# Patient Record
Sex: MALE | URBAN - METROPOLITAN AREA
[De-identification: names, ages, dates, MRNs, and addresses within clinical notes are randomized per-mention and may not be internally consistent; named-entity substitution may affect disease eponyms.]

---

## 2023-06-15 ENCOUNTER — NURSING HOME VISIT (OUTPATIENT)
Dept: POST ACUTE CARE | Facility: EXTERNAL LOCATION | Age: 63
End: 2023-06-15

## 2023-06-15 VITALS
TEMPERATURE: 97.5 F | SYSTOLIC BLOOD PRESSURE: 159 MMHG | HEART RATE: 75 BPM | DIASTOLIC BLOOD PRESSURE: 61 MMHG | WEIGHT: 268 LBS

## 2023-06-15 DIAGNOSIS — F02.B0 MODERATE EARLY ONSET ALZHEIMER'S DEMENTIA WITHOUT BEHAVIORAL DISTURBANCE, PSYCHOTIC DISTURBANCE, MOOD DISTURBANCE, OR ANXIETY (MULTI): ICD-10-CM

## 2023-06-15 DIAGNOSIS — G30.0 MODERATE EARLY ONSET ALZHEIMER'S DEMENTIA WITHOUT BEHAVIORAL DISTURBANCE, PSYCHOTIC DISTURBANCE, MOOD DISTURBANCE, OR ANXIETY (MULTI): ICD-10-CM

## 2023-06-15 DIAGNOSIS — I10 PRIMARY HYPERTENSION: Primary | ICD-10-CM

## 2023-06-15 PROCEDURE — 99342 HOME/RES VST NEW LOW MDM 30: CPT | Performed by: NURSE PRACTITIONER

## 2023-06-15 NOTE — ASSESSMENT & PLAN NOTE
Patient stable, will continue same treatment and monitor. Nursing to inform CNP/MD of any changes in condition or new problems    On Seroquel and Namenda

## 2023-06-15 NOTE — PROGRESS NOTES
Subjective   Dilshad Rees is a 63 y.o. male who is assisted living/ home patient being seen and evaluated for multiple medical problems.    63y old male with early onset dementia is newly admitted to [facility] memory care unit. He seems to be adjusting well, he was being seen by a psychiatric NP. He has been stable on current medications. A call was placed to her wife and discussed any concerns she may have.    50% of time was spent on preparing to see the patient, performing exam or evaluation, coordination of care, ordering medications,tests and labs, referring and communicating with other health care providers , interpreting results, obtaining and reviewing history, tests, medications and documenting clinical information  EMR. Time spent >35 minutes             Objective   /61   Pulse 75   Temp 36.4 °C (97.5 °F)   Wt 122 kg (268 lb)     Physical Exam  Vitals and nursing note reviewed.   Constitutional:       General: He is not in acute distress.  HENT:      Head: Normocephalic and atraumatic.   Eyes:      Pupils: Pupils are equal, round, and reactive to light.   Cardiovascular:      Rate and Rhythm: Normal rate and regular rhythm.   Pulmonary:      Effort: Pulmonary effort is normal.      Breath sounds: Normal breath sounds.   Skin:     General: Skin is warm and dry.   Neurological:      Mental Status: He is alert. He is disoriented.   Psychiatric:         Mood and Affect: Mood normal.         Assessment/Plan   Problem List Items Addressed This Visit       Moderate early onset Alzheimer's dementia without behavioral disturbance, psychotic disturbance, mood disturbance, or anxiety (CMS/HCC)     Patient stable, will continue same treatment and monitor. Nursing to inform CNP/MD of any changes in condition or new problems    On Seroquel and Namenda         Primary hypertension - Primary     Monitor and continue same treatment  On Fosinopril 40mg daily

## 2023-06-15 NOTE — LETTER
Patient: Dilshad Rees  : 1960    Encounter Date: 06/15/2023    Subjective  Dilshad Rees is a 63 y.o. male who is assisted living/ home patient being seen and evaluated for multiple medical problems.    63y old male with early onset dementia is newly admitted to [facility] memory care unit. He seems to be adjusting well, he was being seen by a psychiatric NP. He has been stable on current medications. A call was placed to her wife and discussed any concerns she may have.    50% of time was spent on preparing to see the patient, performing exam or evaluation, coordination of care, ordering medications,tests and labs, referring and communicating with other health care providers , interpreting results, obtaining and reviewing history, tests, medications and documenting clinical information  EMR. Time spent >35 minutes             Objective  /61   Pulse 75   Temp 36.4 °C (97.5 °F)   Wt 122 kg (268 lb)     Physical Exam  Vitals and nursing note reviewed.   Constitutional:       General: He is not in acute distress.  HENT:      Head: Normocephalic and atraumatic.   Eyes:      Pupils: Pupils are equal, round, and reactive to light.   Cardiovascular:      Rate and Rhythm: Normal rate and regular rhythm.   Pulmonary:      Effort: Pulmonary effort is normal.      Breath sounds: Normal breath sounds.   Skin:     General: Skin is warm and dry.   Neurological:      Mental Status: He is alert. He is disoriented.   Psychiatric:         Mood and Affect: Mood normal.         Assessment/Plan  Problem List Items Addressed This Visit       Moderate early onset Alzheimer's dementia without behavioral disturbance, psychotic disturbance, mood disturbance, or anxiety (CMS/HCC)     Patient stable, will continue same treatment and monitor. Nursing to inform CNP/MD of any changes in condition or new problems    On Seroquel and Namenda         Primary hypertension - Primary     Monitor and continue same treatment  On  Fosinopril 40mg daily              Electronically Signed By: TIFFANY Lou   6/15/23  7:12 PM

## 2023-06-20 ENCOUNTER — NURSING HOME VISIT (OUTPATIENT)
Dept: POST ACUTE CARE | Facility: EXTERNAL LOCATION | Age: 63
End: 2023-06-20

## 2023-06-20 DIAGNOSIS — F02.B0 MODERATE EARLY ONSET ALZHEIMER'S DEMENTIA WITHOUT BEHAVIORAL DISTURBANCE, PSYCHOTIC DISTURBANCE, MOOD DISTURBANCE, OR ANXIETY (MULTI): Primary | ICD-10-CM

## 2023-06-20 DIAGNOSIS — E55.9 VITAMIN D DEFICIENCY: ICD-10-CM

## 2023-06-20 DIAGNOSIS — G30.0 MODERATE EARLY ONSET ALZHEIMER'S DEMENTIA WITHOUT BEHAVIORAL DISTURBANCE, PSYCHOTIC DISTURBANCE, MOOD DISTURBANCE, OR ANXIETY (MULTI): Primary | ICD-10-CM

## 2023-06-20 PROCEDURE — 99348 HOME/RES VST EST LOW MDM 30: CPT | Performed by: NURSE PRACTITIONER

## 2023-06-20 NOTE — LETTER
Patient: Dilshad Rees  : 1960    Encounter Date: 2023    Subjective  Dilshad Rees is a 63 y.o. male who is assisted living/ home patient being seen and evaluated for multiple medical problems.    Labs reviewed and okay except Vitamin D was low, B12 pending.    There were multiple medical problems reviewed. Medications, labs and orders reviewed. Patient seen and evaluated,   discussed with nursing staff                 Objective      Physical Exam  Vitals and nursing note reviewed.   Constitutional:       General: He is not in acute distress.  HENT:      Head: Normocephalic and atraumatic.   Pulmonary:      Effort: Pulmonary effort is normal.   Neurological:      Mental Status: He is alert. He is disoriented.   Psychiatric:         Mood and Affect: Mood normal.         Assessment/Plan  Problem List Items Addressed This Visit       Moderate early onset Alzheimer's dementia without behavioral disturbance, psychotic disturbance, mood disturbance, or anxiety (CMS/HCC) - Primary     Patient stable, will continue same treatment and monitor. Nursing to inform CNP/MD of any changes in condition or new problems         Vitamin D deficiency     Level 26.98  Start Vitamin D 3 2000 units daily              Electronically Signed By: TIFFANY Lou   23  3:55 PM

## 2023-06-20 NOTE — PROGRESS NOTES
Subjective   Dilshad Rees is a 63 y.o. male who is assisted living/ home patient being seen and evaluated for multiple medical problems.    Labs reviewed and okay except Vitamin D was low, B12 pending.    There were multiple medical problems reviewed. Medications, labs and orders reviewed. Patient seen and evaluated,   discussed with nursing staff                 Objective       Physical Exam  Vitals and nursing note reviewed.   Constitutional:       General: He is not in acute distress.  HENT:      Head: Normocephalic and atraumatic.   Pulmonary:      Effort: Pulmonary effort is normal.   Neurological:      Mental Status: He is alert. He is disoriented.   Psychiatric:         Mood and Affect: Mood normal.         Assessment/Plan   Problem List Items Addressed This Visit       Moderate early onset Alzheimer's dementia without behavioral disturbance, psychotic disturbance, mood disturbance, or anxiety (CMS/HCC) - Primary     Patient stable, will continue same treatment and monitor. Nursing to inform CNP/MD of any changes in condition or new problems         Vitamin D deficiency     Level 26.98  Start Vitamin D 3 2000 units daily

## 2023-07-12 DIAGNOSIS — I10 PRIMARY HYPERTENSION: Primary | ICD-10-CM

## 2023-07-12 RX ORDER — FOSINOPRIL SODIUM 20 MG/1
40 TABLET ORAL DAILY
Qty: 60 TABLET | Refills: 11 | Status: SHIPPED | OUTPATIENT
Start: 2023-07-12 | End: 2024-07-11

## 2023-07-18 ENCOUNTER — NURSING HOME VISIT (OUTPATIENT)
Dept: POST ACUTE CARE | Facility: EXTERNAL LOCATION | Age: 63
End: 2023-07-18
Payer: MEDICARE

## 2023-07-18 DIAGNOSIS — L70.8 FOLLICULAR ACNE: ICD-10-CM

## 2023-07-18 DIAGNOSIS — R44.1 HALLUCINATION, VISUAL: Primary | ICD-10-CM

## 2023-07-18 PROBLEM — L70.0 ACNE VULGARIS: Status: ACTIVE | Noted: 2023-07-18

## 2023-07-18 PROCEDURE — 99349 HOME/RES VST EST MOD MDM 40: CPT | Performed by: NURSE PRACTITIONER

## 2023-07-18 NOTE — LETTER
Patient: Dilshad Rees  : 1960    Encounter Date: 2023    Subjective  Dilshad Rees is a 63 y.o. male who is assisted living/ home patient being seen and evaluated for multiple medical problems.    Resident seen today with his wife at bedside for concerns of some rash on the back of his neck and he has been hallucinating. He states the bugs are everywhere and are causing the bumps on his posterior neck. His wife does have an appointment with his Neurologist tomorrow, he may increase his seroquel. Also discussed with her a plan to obtain labs and a UA. I believe what is on the back of his neck is acne or ingrown hair follicles. She states he is always touching behind his head and had been shaving excessively, razor has since been removed. Spoke with pharmacy also regarding treatment change.     50% of time was spent on preparing to see the patient, performing exam or evaluation, coordination of care, ordering medications,tests and labs, referring and communicating with other health care providers , interpreting results, obtaining and reviewing history, tests, medications and documenting clinical information  EMR. Time spent >35 minutes             Objective  There were no vitals taken for this visit.    Physical Exam  Vitals and nursing note reviewed.   Constitutional:       General: He is not in acute distress.  HENT:      Head: Normocephalic and atraumatic.   Pulmonary:      Effort: Pulmonary effort is normal.   Musculoskeletal:      Cervical back: Normal range of motion.   Skin:     General: Skin is warm and dry.      Comments: Small red papules scattered on back of neck area below the hairline.    Neurological:      Mental Status: He is alert. He is disoriented.   Psychiatric:         Mood and Affect: Mood normal.         Assessment/Plan  Problem List Items Addressed This Visit       Hallucination, visual - Primary     Check UA C&S, CBC and BMP  Follow up as scheduled with neurology          Follicular acne     Acne verses ingrown hair follicles that are inflamed.  Tetracycline ordered but not available, pharmacy will substitute with medication equivalent x 2 weeks  Also tretinoin not available and expensive , pharmacy substitute with Benzoyl peroxide BID x 2 weeks               Electronically Signed By: TIFFANY Lou   7/18/23  5:07 PM

## 2023-07-18 NOTE — ASSESSMENT & PLAN NOTE
Acne verses ingrown hair follicles that are inflamed.  Tetracycline ordered but not available, pharmacy will substitute with medication equivalent x 2 weeks  Also tretinoin not available and expensive , pharmacy substitute with Benzoyl peroxide BID x 2 weeks

## 2023-07-18 NOTE — PROGRESS NOTES
Subjective   Dilshad Rees is a 63 y.o. male who is assisted living/ home patient being seen and evaluated for multiple medical problems.    Resident seen today with his wife at bedside for concerns of some rash on the back of his neck and he has been hallucinating. He states the bugs are everywhere and are causing the bumps on his posterior neck. His wife does have an appointment with his Neurologist tomorrow, he may increase his seroquel. Also discussed with her a plan to obtain labs and a UA. I believe what is on the back of his neck is acne or ingrown hair follicles. She states he is always touching behind his head and had been shaving excessively, razor has since been removed. Spoke with pharmacy also regarding treatment change.     50% of time was spent on preparing to see the patient, performing exam or evaluation, coordination of care, ordering medications,tests and labs, referring and communicating with other health care providers , interpreting results, obtaining and reviewing history, tests, medications and documenting clinical information  EMR. Time spent >35 minutes             Objective   There were no vitals taken for this visit.    Physical Exam  Vitals and nursing note reviewed.   Constitutional:       General: He is not in acute distress.  HENT:      Head: Normocephalic and atraumatic.   Pulmonary:      Effort: Pulmonary effort is normal.   Musculoskeletal:      Cervical back: Normal range of motion.   Skin:     General: Skin is warm and dry.      Comments: Small red papules scattered on back of neck area below the hairline.    Neurological:      Mental Status: He is alert. He is disoriented.   Psychiatric:         Mood and Affect: Mood normal.         Assessment/Plan   Problem List Items Addressed This Visit       Hallucination, visual - Primary     Check UA C&S, CBC and BMP  Follow up as scheduled with neurology         Follicular acne     Acne verses ingrown hair follicles that are  inflamed.  Tetracycline ordered but not available, pharmacy will substitute with medication equivalent x 2 weeks  Also tretinoin not available and expensive , pharmacy substitute with Benzoyl peroxide BID x 2 weeks

## 2023-07-24 ENCOUNTER — NURSING HOME VISIT (OUTPATIENT)
Dept: POST ACUTE CARE | Facility: EXTERNAL LOCATION | Age: 63
End: 2023-07-24
Payer: MEDICARE

## 2023-07-24 DIAGNOSIS — L70.8 FOLLICULAR ACNE: ICD-10-CM

## 2023-07-24 DIAGNOSIS — G30.0 MODERATE EARLY ONSET ALZHEIMER'S DEMENTIA WITHOUT BEHAVIORAL DISTURBANCE, PSYCHOTIC DISTURBANCE, MOOD DISTURBANCE, OR ANXIETY (MULTI): Primary | ICD-10-CM

## 2023-07-24 DIAGNOSIS — F02.B0 MODERATE EARLY ONSET ALZHEIMER'S DEMENTIA WITHOUT BEHAVIORAL DISTURBANCE, PSYCHOTIC DISTURBANCE, MOOD DISTURBANCE, OR ANXIETY (MULTI): Primary | ICD-10-CM

## 2023-07-24 DIAGNOSIS — R44.1 HALLUCINATION, VISUAL: ICD-10-CM

## 2023-07-24 PROCEDURE — 99348 HOME/RES VST EST LOW MDM 30: CPT | Performed by: NURSE PRACTITIONER

## 2023-07-24 NOTE — PROGRESS NOTES
Subjective   Dilshad Rees is a 63 y.o. male who is assisted living/ home patient being seen and evaluated for multiple medical problems.    Follow up on rash and hallucinations. He was seen by neurology/psych NP and his Seroquel was increased to 50 mg, seems to be doing better. His acne to back of his neck and face is resolved, skin looks clear. He was started on Benzoyl peroxide cream last visit. Blood work and UA were negative for any acute problems    There were multiple medical problems reviewed. Medications, labs and orders reviewed. Patient seen and evaluated,   discussed with nursing staff             Objective   There were no vitals taken for this visit.    Physical Exam  Vitals and nursing note reviewed.   Constitutional:       General: He is not in acute distress.  HENT:      Head: Normocephalic and atraumatic.   Pulmonary:      Effort: Pulmonary effort is normal.   Musculoskeletal:      Cervical back: Normal range of motion.   Skin:     General: Skin is warm and dry.   Neurological:      Mental Status: He is alert. He is disoriented.   Psychiatric:         Mood and Affect: Mood normal.         Assessment/Plan   Problem List Items Addressed This Visit       Moderate early onset Alzheimer's dementia without behavioral disturbance, psychotic disturbance, mood disturbance, or anxiety (CMS/HCC) - Primary    Hallucination, visual     Improved  Seroquel increased         Follicular acne     Resolved at this time

## 2023-07-24 NOTE — LETTER
Patient: Dilshad Rees  : 1960    Encounter Date: 2023    Subjective  Dilshad Rees is a 63 y.o. male who is assisted living/ home patient being seen and evaluated for multiple medical problems.    Follow up on rash and hallucinations. He was seen by neurology/psych NP and his Seroquel was increased to 50 mg, seems to be doing better. His acne to back of his neck and face is resolved, skin looks clear. He was started on Benzoyl peroxide cream last visit. Blood work and UA were negative for any acute problems    There were multiple medical problems reviewed. Medications, labs and orders reviewed. Patient seen and evaluated,   discussed with nursing staff             Objective  There were no vitals taken for this visit.    Physical Exam  Vitals and nursing note reviewed.   Constitutional:       General: He is not in acute distress.  HENT:      Head: Normocephalic and atraumatic.   Pulmonary:      Effort: Pulmonary effort is normal.   Musculoskeletal:      Cervical back: Normal range of motion.   Skin:     General: Skin is warm and dry.   Neurological:      Mental Status: He is alert. He is disoriented.   Psychiatric:         Mood and Affect: Mood normal.         Assessment/Plan  Problem List Items Addressed This Visit       Moderate early onset Alzheimer's dementia without behavioral disturbance, psychotic disturbance, mood disturbance, or anxiety (CMS/HCC) - Primary    Hallucination, visual     Improved  Seroquel increased         Follicular acne     Resolved at this time              Electronically Signed By: LLOYD Lou-CNP   23  3:05 PM

## 2024-01-16 ENCOUNTER — NURSING HOME VISIT (OUTPATIENT)
Dept: POST ACUTE CARE | Facility: EXTERNAL LOCATION | Age: 64
End: 2024-01-16
Payer: MEDICARE

## 2024-01-16 DIAGNOSIS — F02.B0 MODERATE EARLY ONSET ALZHEIMER'S DEMENTIA WITHOUT BEHAVIORAL DISTURBANCE, PSYCHOTIC DISTURBANCE, MOOD DISTURBANCE, OR ANXIETY (MULTI): Primary | ICD-10-CM

## 2024-01-16 DIAGNOSIS — I10 PRIMARY HYPERTENSION: ICD-10-CM

## 2024-01-16 DIAGNOSIS — G30.0 MODERATE EARLY ONSET ALZHEIMER'S DEMENTIA WITHOUT BEHAVIORAL DISTURBANCE, PSYCHOTIC DISTURBANCE, MOOD DISTURBANCE, OR ANXIETY (MULTI): Primary | ICD-10-CM

## 2024-01-16 DIAGNOSIS — E55.9 VITAMIN D DEFICIENCY: ICD-10-CM

## 2024-01-16 PROCEDURE — 99347 HOME/RES VST EST SF MDM 20: CPT | Performed by: INTERNAL MEDICINE

## 2024-01-16 NOTE — PROGRESS NOTES
Follow-up visit  Patient has no complaints and no issues reported by staff.  We are seeing the patient on a routine follow-up.    Medications and orders were reviewed.  Labs reviewed again and were essentially unremarkable for except for a low vitamin D.  These labs were from several months ago and the patient has currently been placed on vitamin D 2000 units daily.    Review of systems  Patient denies pain  Patient denies postural dizziness  No shortness of breath chest pain  No GI distress    Assessment and care plan  Vital signs are stable with a blood pressure 123/63, temp 97 6, pulse 78 pulse ox 98%  He is alert pleasant no apparent distress HEENT is grossly unremarkable.  Lungs are clear.  Heart rate and rhythm is regular.  No significant edema.  Abdomen is soft    Assessment and care plan  Dementia which appears stable with no behavioral issues  Hypertension appears adequately controlled.    Vitamin D insufficiency is on supplement.  No new orders at this time

## 2024-07-15 ENCOUNTER — TELEPHONE (OUTPATIENT)
Dept: PRIMARY CARE | Facility: CLINIC | Age: 64
End: 2024-07-15
Payer: MEDICARE

## 2024-07-15 DIAGNOSIS — G30.0 MODERATE EARLY ONSET ALZHEIMER'S DEMENTIA WITHOUT BEHAVIORAL DISTURBANCE, PSYCHOTIC DISTURBANCE, MOOD DISTURBANCE, OR ANXIETY (MULTI): ICD-10-CM

## 2024-07-15 DIAGNOSIS — F02.B0 MODERATE EARLY ONSET ALZHEIMER'S DEMENTIA WITHOUT BEHAVIORAL DISTURBANCE, PSYCHOTIC DISTURBANCE, MOOD DISTURBANCE, OR ANXIETY (MULTI): ICD-10-CM

## 2024-07-15 NOTE — TELEPHONE ENCOUNTER
Patient diagnosed with Alzheimers, difficulty getting to MD office. Patient current with [facility] who referred patient to [facility] for primary care. Appointment scheduled w/ Ave Koenig NP 10:00am.

## 2024-07-17 ENCOUNTER — TELEPHONE (OUTPATIENT)
Dept: PRIMARY CARE | Facility: CLINIC | Age: 64
End: 2024-07-17
Payer: MEDICARE

## 2024-07-18 ENCOUNTER — OFFICE VISIT (OUTPATIENT)
Dept: PRIMARY CARE | Facility: CLINIC | Age: 64
End: 2024-07-18
Payer: MEDICARE

## 2024-07-18 VITALS
RESPIRATION RATE: 16 BRPM | SYSTOLIC BLOOD PRESSURE: 140 MMHG | TEMPERATURE: 99.6 F | OXYGEN SATURATION: 96 % | HEART RATE: 62 BPM | DIASTOLIC BLOOD PRESSURE: 72 MMHG | WEIGHT: 246.8 LBS

## 2024-07-18 DIAGNOSIS — G30.0 MODERATE EARLY ONSET ALZHEIMER'S DEMENTIA WITHOUT BEHAVIORAL DISTURBANCE, PSYCHOTIC DISTURBANCE, MOOD DISTURBANCE, OR ANXIETY (MULTI): Primary | ICD-10-CM

## 2024-07-18 DIAGNOSIS — F02.B0 MODERATE EARLY ONSET ALZHEIMER'S DEMENTIA WITHOUT BEHAVIORAL DISTURBANCE, PSYCHOTIC DISTURBANCE, MOOD DISTURBANCE, OR ANXIETY (MULTI): ICD-10-CM

## 2024-07-18 DIAGNOSIS — G30.0 MODERATE EARLY ONSET ALZHEIMER'S DEMENTIA WITHOUT BEHAVIORAL DISTURBANCE, PSYCHOTIC DISTURBANCE, MOOD DISTURBANCE, OR ANXIETY (MULTI): ICD-10-CM

## 2024-07-18 DIAGNOSIS — E55.9 VITAMIN D DEFICIENCY: ICD-10-CM

## 2024-07-18 DIAGNOSIS — F02.B0 MODERATE EARLY ONSET ALZHEIMER'S DEMENTIA WITHOUT BEHAVIORAL DISTURBANCE, PSYCHOTIC DISTURBANCE, MOOD DISTURBANCE, OR ANXIETY (MULTI): Primary | ICD-10-CM

## 2024-07-18 DIAGNOSIS — R44.1 HALLUCINATION, VISUAL: ICD-10-CM

## 2024-07-18 PROCEDURE — 1159F MED LIST DOCD IN RCRD: CPT | Performed by: NURSE PRACTITIONER

## 2024-07-18 PROCEDURE — 1160F RVW MEDS BY RX/DR IN RCRD: CPT | Performed by: NURSE PRACTITIONER

## 2024-07-18 PROCEDURE — 3077F SYST BP >= 140 MM HG: CPT | Performed by: NURSE PRACTITIONER

## 2024-07-18 PROCEDURE — 3078F DIAST BP <80 MM HG: CPT | Performed by: NURSE PRACTITIONER

## 2024-07-18 PROCEDURE — 1126F AMNT PAIN NOTED NONE PRSNT: CPT | Performed by: NURSE PRACTITIONER

## 2024-07-18 RX ORDER — QUETIAPINE FUMARATE 100 MG/1
100 TABLET, FILM COATED ORAL NIGHTLY
COMMUNITY

## 2024-07-18 RX ORDER — ACETAMINOPHEN 500 MG
TABLET ORAL DAILY
COMMUNITY

## 2024-07-18 RX ORDER — SERTRALINE HYDROCHLORIDE 100 MG/1
50 TABLET, FILM COATED ORAL DAILY
COMMUNITY

## 2024-07-18 ASSESSMENT — PAIN SCALES - GENERAL: PAINLEVEL: 0-NO PAIN

## 2024-07-18 ASSESSMENT — ENCOUNTER SYMPTOMS
FATIGUE: 1
ACTIVITY CHANGE: 1
HALLUCINATIONS: 1
RHINORRHEA: 1
APPETITE CHANGE: 1
EYES NEGATIVE: 1
VOICE CHANGE: 1
CONFUSION: 1

## 2024-07-18 NOTE — PROGRESS NOTES
Subjective   Patient ID: Dilshad Rees is a 64 y.o. male who is being seen to establish care with the [facility].    HPI Pt seen in single family apartment accompanied by wife. PMHx: HTN, Dementia, Vitamin D deficiency, Hallucinations. Pt seen sitting in recliner with legs dependent. Pt alert to self only. ROS/HPI supplemented by wife. Pt diagnosed with dementia in 2017. Pt total care for pts wife. Pt not able to feed self and needs assist with showering, dressing, and medications are given in yogurt. Pts wife starting to sponge bath pt as it is getting harder for her to shower him regularly. Pts balance good and he is physically strong. Pt can only follow one task at a time. Pt with frequent hallucinations. Pt has them any time during the day and not just at nighttime. Pt active with palliative care through [facility]. Pt does all cooking and cleaning in the home. Pt having difficulty getting out of the house and can take her hours to get him out to appointments. Pt follows with neurologist at [facility]. Pt weaning off namenda and has 2 days left and then will be off. Neurology did not think it was helping pt. Pts wife does not want any other dementia medication, she would rather see him at his baseline and deal with him like that. Pt with no family support. Pts wife states she has people looking into help for her. Pts wife being worked up for cancer. Pt with myoclonus and takes him awhile to get going in the mornings and has extreme confusion in the morning. Pt unaware when has to go to bathroom but wife toilets him and has success with that. Pt wears pull ups and is dry in the morning. Pt sleeps approximately 12 hours a night and usually sleeps through the night. Pt does not nap during the day. Pt was a  previously. Pt energy fair, doesn't do much, will take him for walks around the complex and [place]. Appetite not as good as it use to be, she will feed him food until  he says he no longer wants anymore. Tries to give a high protein meal in the morning. Pt was in memory care from November 2022 to March 2024, but was brought home because wife was unhappy with the care he was receiving. Pts wife denies noticing Sob or cough. Denies coughing after eating or drinking. Pt does not complain of pain.   Pt with no other complaints or concerns.  Initial encounter for [facility] NP visit. Program explained and contact information provided, all questions answered with apparent satisfaction.  More than 50% of time spent in face to face discussion a total of 60 minutes with this patient on counseling, coordination, of care, collaboration with staff and family, and review of medical records and  diagnostics  Home Visit medically necessary due to: pt has chronic condition that makes access to a traditional office visit very difficult, illness or condition that results in activity limitation or restriction that impacts the ability to leave home such as; unsteady gait/ poor condition.    Review of Systems   Unable to perform ROS: Dementia   Constitutional:  Positive for activity change, appetite change and fatigue.   HENT:  Positive for congestion, rhinorrhea and voice change.    Eyes: Negative.    Skin: Negative.    Allergic/Immunologic: Positive for environmental allergies.   Psychiatric/Behavioral:  Positive for confusion and hallucinations.        Objective   /72 (BP Location: Left arm, Patient Position: Sitting, BP Cuff Size: Adult)   Pulse 62   Temp 37.6 °C (99.6 °F) (Temporal)   Resp 16   Wt 112 kg (246 lb 12.8 oz)   SpO2 96%       Current Outpatient Medications:     cholecalciferol (Vitamin D3) 50 mcg (2,000 unit) capsule, Take by mouth once daily., Disp: , Rfl:     QUEtiapine (SEROquel) 100 mg tablet, Take 1 tablet (100 mg) by mouth once daily at bedtime., Disp: , Rfl:     sertraline (Zoloft) 100 mg tablet, Take 0.5 tablets (50 mg) by mouth once daily., Disp: , Rfl:      Physical Exam  Constitutional:       Appearance: Normal appearance. He is obese.   HENT:      Head: Normocephalic and atraumatic.      Nose: Nose normal.      Mouth/Throat:      Mouth: Mucous membranes are moist.      Pharynx: Oropharynx is clear.   Eyes:      Conjunctiva/sclera: Conjunctivae normal.      Pupils: Pupils are equal, round, and reactive to light.   Cardiovascular:      Rate and Rhythm: Normal rate and regular rhythm.      Pulses: Normal pulses.      Heart sounds: Normal heart sounds.   Pulmonary:      Effort: Pulmonary effort is normal.      Breath sounds: Examination of the right-lower field reveals decreased breath sounds. Examination of the left-lower field reveals decreased breath sounds. Decreased breath sounds present.   Abdominal:      General: Bowel sounds are normal.      Palpations: Abdomen is soft.   Musculoskeletal:         General: Normal range of motion.      Cervical back: Normal range of motion and neck supple.   Skin:     General: Skin is warm and dry.      Capillary Refill: Capillary refill takes less than 2 seconds.   Neurological:      Mental Status: He is alert. Mental status is at baseline. He is disoriented.   Psychiatric:         Attention and Perception: Attention normal.         Mood and Affect: Mood normal.         Speech: Speech normal.         Behavior: Behavior is cooperative.         Cognition and Memory: Cognition is impaired. Memory is impaired. He exhibits impaired recent memory and impaired remote memory.     Patient Active Problem List   Diagnosis    Moderate early onset Alzheimer's dementia without behavioral disturbance, psychotic disturbance, mood disturbance, or anxiety (Multi)    Primary hypertension    Vitamin D deficiency    Hallucination, visual    Follicular acne         Assessment/Plan   Diagnoses and all orders for this visit:  Moderate early onset Alzheimer's dementia without behavioral disturbance, psychotic disturbance, mood disturbance, or anxiety  (Multi)  Comments:  Continue Sertraline 50 mg po daily.  Continue Seroquel 100 mg po nightly.  Hallucination, visual  Comments:  Continue Seroquel 100 mg po nightly.  Vitamin D deficiency  Comments:  Continue Vitamin D 200 iu po daily.    Follow up in 2 months or prn  Ave Koenig, APRN-CNP

## 2024-09-25 ENCOUNTER — TELEPHONE (OUTPATIENT)
Dept: PRIMARY CARE | Facility: CLINIC | Age: 64
End: 2024-09-25
Payer: MEDICARE

## 2024-09-26 ENCOUNTER — OFFICE VISIT (OUTPATIENT)
Dept: PRIMARY CARE | Facility: CLINIC | Age: 64
End: 2024-09-26
Payer: MEDICARE

## 2024-09-26 ENCOUNTER — LAB (OUTPATIENT)
Dept: LAB | Facility: LAB | Age: 64
End: 2024-09-26
Payer: MEDICARE

## 2024-09-26 VITALS
TEMPERATURE: 99.1 F | OXYGEN SATURATION: 97 % | WEIGHT: 242.5 LBS | DIASTOLIC BLOOD PRESSURE: 76 MMHG | SYSTOLIC BLOOD PRESSURE: 150 MMHG | RESPIRATION RATE: 16 BRPM | HEART RATE: 56 BPM

## 2024-09-26 DIAGNOSIS — I10 PRIMARY HYPERTENSION: ICD-10-CM

## 2024-09-26 DIAGNOSIS — R53.82 CHRONIC FATIGUE: ICD-10-CM

## 2024-09-26 DIAGNOSIS — G25.3 MYOCLONUS: ICD-10-CM

## 2024-09-26 DIAGNOSIS — R44.1 HALLUCINATION, VISUAL: ICD-10-CM

## 2024-09-26 DIAGNOSIS — E55.9 VITAMIN D DEFICIENCY: ICD-10-CM

## 2024-09-26 DIAGNOSIS — G30.0 MODERATE EARLY ONSET ALZHEIMER'S DEMENTIA WITHOUT BEHAVIORAL DISTURBANCE, PSYCHOTIC DISTURBANCE, MOOD DISTURBANCE, OR ANXIETY (MULTI): ICD-10-CM

## 2024-09-26 DIAGNOSIS — F02.B0 MODERATE EARLY ONSET ALZHEIMER'S DEMENTIA WITHOUT BEHAVIORAL DISTURBANCE, PSYCHOTIC DISTURBANCE, MOOD DISTURBANCE, OR ANXIETY (MULTI): ICD-10-CM

## 2024-09-26 DIAGNOSIS — E55.9 VITAMIN D DEFICIENCY: Primary | ICD-10-CM

## 2024-09-26 LAB
ALBUMIN SERPL BCP-MCNC: 4.4 G/DL (ref 3.4–5)
ALP SERPL-CCNC: 42 U/L (ref 33–136)
ALT SERPL W P-5'-P-CCNC: 13 U/L (ref 10–52)
ANION GAP SERPL CALCULATED.3IONS-SCNC: 13 MMOL/L (ref 10–20)
AST SERPL W P-5'-P-CCNC: 14 U/L (ref 9–39)
BILIRUB SERPL-MCNC: 1 MG/DL (ref 0–1.2)
BUN SERPL-MCNC: 14 MG/DL (ref 6–23)
CALCIUM SERPL-MCNC: 9.2 MG/DL (ref 8.6–10.3)
CHLORIDE SERPL-SCNC: 104 MMOL/L (ref 98–107)
CO2 SERPL-SCNC: 26 MMOL/L (ref 21–32)
CREAT SERPL-MCNC: 0.6 MG/DL (ref 0.5–1.3)
EGFRCR SERPLBLD CKD-EPI 2021: >90 ML/MIN/1.73M*2
ERYTHROCYTE [DISTWIDTH] IN BLOOD BY AUTOMATED COUNT: 12.4 % (ref 11.5–14.5)
GLUCOSE SERPL-MCNC: 87 MG/DL (ref 74–99)
HCT VFR BLD AUTO: 38.2 % (ref 41–52)
HGB BLD-MCNC: 13.1 G/DL (ref 13.5–17.5)
MCH RBC QN AUTO: 30.2 PG (ref 26–34)
MCHC RBC AUTO-ENTMCNC: 34.3 G/DL (ref 32–36)
MCV RBC AUTO: 88 FL (ref 80–100)
NRBC BLD-RTO: 0 /100 WBCS (ref 0–0)
PLATELET # BLD AUTO: 287 X10*3/UL (ref 150–450)
POTASSIUM SERPL-SCNC: 4.1 MMOL/L (ref 3.5–5.3)
PROT SERPL-MCNC: 6.7 G/DL (ref 6.4–8.2)
RBC # BLD AUTO: 4.34 X10*6/UL (ref 4.5–5.9)
SODIUM SERPL-SCNC: 139 MMOL/L (ref 136–145)
TSH SERPL-ACNC: 2.12 MIU/L (ref 0.44–3.98)
WBC # BLD AUTO: 6.2 X10*3/UL (ref 4.4–11.3)

## 2024-09-26 PROCEDURE — 84443 ASSAY THYROID STIM HORMONE: CPT

## 2024-09-26 PROCEDURE — 1160F RVW MEDS BY RX/DR IN RCRD: CPT | Performed by: NURSE PRACTITIONER

## 2024-09-26 PROCEDURE — 36415 COLL VENOUS BLD VENIPUNCTURE: CPT

## 2024-09-26 PROCEDURE — 36415 COLL VENOUS BLD VENIPUNCTURE: CPT | Performed by: NURSE PRACTITIONER

## 2024-09-26 PROCEDURE — 99349 HOME/RES VST EST MOD MDM 40: CPT | Performed by: NURSE PRACTITIONER

## 2024-09-26 PROCEDURE — 1159F MED LIST DOCD IN RCRD: CPT | Performed by: NURSE PRACTITIONER

## 2024-09-26 PROCEDURE — 85027 COMPLETE CBC AUTOMATED: CPT

## 2024-09-26 PROCEDURE — 3078F DIAST BP <80 MM HG: CPT | Performed by: NURSE PRACTITIONER

## 2024-09-26 PROCEDURE — 80053 COMPREHEN METABOLIC PANEL: CPT

## 2024-09-26 PROCEDURE — 1126F AMNT PAIN NOTED NONE PRSNT: CPT | Performed by: NURSE PRACTITIONER

## 2024-09-26 PROCEDURE — 82306 VITAMIN D 25 HYDROXY: CPT

## 2024-09-26 PROCEDURE — 3077F SYST BP >= 140 MM HG: CPT | Performed by: NURSE PRACTITIONER

## 2024-09-26 RX ORDER — FOSINOPRIL SODIUM 40 MG/1
40 TABLET ORAL DAILY
COMMUNITY

## 2024-09-26 RX ORDER — LEVETIRACETAM 100 MG/ML
250 SOLUTION ORAL NIGHTLY
COMMUNITY

## 2024-09-26 ASSESSMENT — ENCOUNTER SYMPTOMS
CONFUSION: 1
TROUBLE SWALLOWING: 1
RECTAL PAIN: 1
ACTIVITY CHANGE: 1
APPETITE CHANGE: 1
FATIGUE: 1

## 2024-09-26 ASSESSMENT — PAIN SCALES - GENERAL: PAINLEVEL: 0-NO PAIN

## 2024-09-26 NOTE — PROGRESS NOTES
Subjective   Patient ID: William Rees is a 64 y.o. male who is being seen for routine two month house calls follow up.    HPI Pt seen in single family apartment. PMHx: HTN, Dementia, Vitamin D deficiency, Hallucinations, delusions, paranoia, myoclonus, verbal apraxia,  Pt seen sitting in recliner with legs dependent. Pt alert to self only. ROS/HPI supplemented by wife. Pt saw nurse from [facility] who states pt qualifies for placment into memory care. Pts wife reluctanct to take to talk about it on front of pt. Pt needs to be approved for [employer] which pts wife states should not be a problem. States he can be somewhere while waiting. They haven't given a list of places. Pts wife thinking about [facility]. Seroquel recently increased but still not helping with behaviors and has a message out to neurologist. Giving pills in yogurt or pudding due to the myoclonus, seems to be better at night and can take pills with water. In morning having a hard time getting simple tasks done. Still relies on wife for all Adl's and medications.  Worse confusion in the morning and forgeting what things are and how they work.  Appetite not eating as much as he was. Losing weight. Sleeping poorly having more hallucinations at night and thinking his wife is part of the hallucination. Wife still takes pt on drives and gets out the house. Ambultion shuffling more, posture is shifting more to leaning forward. Pt diffcult to shave. Not napping during the day. Sleeps until 9 and goes to bed around 6pm but can wake up multiple times per night. Still very strong. Pts wife thinks he is having trouble with swallowing, constant clearing of throat and spitting, drooling noted. No coughing assocaited with eating or drinking, given a smaller straw so he doesn't get as much liquid at a time. Pt states it feels like throat is  tight, swallowing eval ordered by neurology. Pt with bowel movement daily, difficult to clean to do  prolopased rectum, feces getting stuck in area. Pt incontinent to both bowel and bladder but wife toilets him every couple hours with success.  Drinks protien drink daily. Pts wife denies noticing any ARNOLD or SOB. Pt denies any pain at present. Pt still active with navigator program through hospice of [facility]. Pts wife undergoing testing tomorrow to determine if she has cancer and is concerned what may happen to .   Pts wife with no other concerns of complaints at present.   Home Visit medically necessary due to: pt has chronic condition that makes access to a traditional office visit very difficult, illness or condition that results in activity limitation or restriction that impacts the ability to leave home such as; unsteady gait/ poor condition.      Review of Systems   Unable to perform ROS: Dementia   Constitutional:  Positive for activity change, appetite change and fatigue.   HENT:  Positive for trouble swallowing.    Cardiovascular:  Positive for leg swelling.   Gastrointestinal:  Positive for rectal pain (prolapsed rectum).   Psychiatric/Behavioral:  Positive for confusion.        Objective   /76 (BP Location: Left arm, Patient Position: Sitting, BP Cuff Size: Adult)   Pulse 56   Temp 37.3 °C (99.1 °F) (Temporal)   Resp 16   Wt 110 kg (242 lb 8 oz)   SpO2 97%     Current Outpatient Medications:     cholecalciferol (Vitamin D3) 50 mcg (2,000 unit) capsule, Take by mouth once daily., Disp: , Rfl:     QUEtiapine (SEROquel) 100 mg tablet, Take 1.5 tablets (150 mg) by mouth once daily at bedtime., Disp: , Rfl:     sertraline (Zoloft) 100 mg tablet, Take 0.5 tablets (50 mg) by mouth once daily., Disp: , Rfl:     fosinopril (Monopril) 40 mg tablet, Take 1 tablet (40 mg) by mouth once daily., Disp: , Rfl:     levETIRAcetam (Keppra) 100 mg/mL solution, Take 2.5 mL (250 mg) by mouth once daily at bedtime., Disp: , Rfl:     Physical Exam  Constitutional:       Appearance: Normal  appearance. He is obese.   HENT:      Head: Normocephalic and atraumatic.      Nose: Nose normal.      Mouth/Throat:      Mouth: Mucous membranes are moist.      Pharynx: Oropharynx is clear.   Eyes:      Conjunctiva/sclera: Conjunctivae normal.      Pupils: Pupils are equal, round, and reactive to light.   Cardiovascular:      Rate and Rhythm: Normal rate and regular rhythm.      Pulses: Normal pulses.      Heart sounds: Normal heart sounds.      Comments: Trace lower extremity edema  Pulmonary:      Effort: Pulmonary effort is normal.      Breath sounds: Examination of the right-lower field reveals decreased breath sounds. Examination of the left-lower field reveals decreased breath sounds. Decreased breath sounds present.   Abdominal:      General: Bowel sounds are normal.      Palpations: Abdomen is soft.   Musculoskeletal:      Cervical back: Normal range of motion and neck supple.      Right lower leg: Edema present.      Left lower leg: Edema present.   Skin:     General: Skin is warm and dry.      Capillary Refill: Capillary refill takes 2 to 3 seconds.   Neurological:      Mental Status: He is alert.      Motor: Weakness present.      Gait: Gait abnormal.   Psychiatric:         Attention and Perception: Attention normal.         Mood and Affect: Mood normal.         Speech: Speech normal.         Behavior: Behavior normal. Behavior is cooperative.         Cognition and Memory: Cognition is impaired. Memory is impaired. He exhibits impaired recent memory and impaired remote memory.       Patient Active Problem List   Diagnosis    Moderate early onset Alzheimer's dementia without behavioral disturbance, psychotic disturbance, mood disturbance, or anxiety (Multi)    Primary hypertension    Vitamin D deficiency    Hallucination, visual    Follicular acne    Myoclonus     Assessment/Plan   Diagnoses and all orders for this visit:  Vitamin D deficiency  Comments:  Continue Vitamin D 2000 iu po daily.  Check  Vitamin D level.  Orders:  -     Vitamin D 25-Hydroxy,Total (for eval of Vitamin D levels); Future  Chronic fatigue  Comments:  Check TSH  Orders:  -     Tsh With Reflex To Free T4 If Abnormal; Future  Primary hypertension  Comments:  Stable  Continue Monopril 40 mg po daily.  Check CBC and BMP  Orders:  -     CBC; Future  -     Comprehensive metabolic panel; Future  Moderate early onset Alzheimer's dementia without behavioral disturbance, psychotic disturbance, mood disturbance, or anxiety (Multi)  Comments:  Continue Seroqeul 150 mg po at bedtime  Hallucination, visual  Myoclonus  Comments:  Continue Keppta 250mg po daily, 2.5 ml    Labs drawn without complications   Follow up in 2 months or prn  Ave Koenig, APRN-CNP

## 2024-09-27 LAB — 25(OH)D3 SERPL-MCNC: 42 NG/ML (ref 30–100)

## 2024-10-01 ENCOUNTER — TELEPHONE (OUTPATIENT)
Dept: PRIMARY CARE | Facility: CLINIC | Age: 64
End: 2024-10-01
Payer: MEDICARE

## 2024-10-01 ENCOUNTER — APPOINTMENT (OUTPATIENT)
Dept: RADIOLOGY | Facility: HOSPITAL | Age: 64
End: 2024-10-01
Payer: MEDICARE

## 2024-10-01 ENCOUNTER — HOSPITAL ENCOUNTER (EMERGENCY)
Facility: HOSPITAL | Age: 64
Discharge: OTHER INSTITUTION | End: 2024-10-02
Attending: STUDENT IN AN ORGANIZED HEALTH CARE EDUCATION/TRAINING PROGRAM
Payer: MEDICARE

## 2024-10-01 ENCOUNTER — APPOINTMENT (OUTPATIENT)
Dept: CARDIOLOGY | Facility: HOSPITAL | Age: 64
End: 2024-10-01
Payer: MEDICARE

## 2024-10-01 DIAGNOSIS — R44.3 HALLUCINATIONS: Primary | ICD-10-CM

## 2024-10-01 DIAGNOSIS — F02.C0 SEVERE EARLY ONSET ALZHEIMER'S DEMENTIA, UNSPECIFIED WHETHER BEHAVIORAL, PSYCHOTIC, OR MOOD DISTURBANCE OR ANXIETY: ICD-10-CM

## 2024-10-01 DIAGNOSIS — G30.0 SEVERE EARLY ONSET ALZHEIMER'S DEMENTIA, UNSPECIFIED WHETHER BEHAVIORAL, PSYCHOTIC, OR MOOD DISTURBANCE OR ANXIETY: ICD-10-CM

## 2024-10-01 LAB
ALBUMIN SERPL BCP-MCNC: 4 G/DL (ref 3.4–5)
ALP SERPL-CCNC: 45 U/L (ref 33–136)
ALT SERPL W P-5'-P-CCNC: 9 U/L (ref 10–52)
AMMONIA PLAS-SCNC: 56 UMOL/L (ref 16–53)
ANION GAP SERPL CALCULATED.3IONS-SCNC: 12 MMOL/L (ref 10–20)
AST SERPL W P-5'-P-CCNC: 13 U/L (ref 9–39)
BASOPHILS # BLD AUTO: 0.05 X10*3/UL (ref 0–0.1)
BASOPHILS NFR BLD AUTO: 0.8 %
BILIRUB SERPL-MCNC: 0.7 MG/DL (ref 0–1.2)
BUN SERPL-MCNC: 16 MG/DL (ref 6–23)
CALCIUM SERPL-MCNC: 9 MG/DL (ref 8.6–10.3)
CARDIAC TROPONIN I PNL SERPL HS: 3 NG/L (ref 0–20)
CARDIAC TROPONIN I PNL SERPL HS: <3 NG/L (ref 0–20)
CHLORIDE SERPL-SCNC: 106 MMOL/L (ref 98–107)
CO2 SERPL-SCNC: 25 MMOL/L (ref 21–32)
CREAT SERPL-MCNC: 0.68 MG/DL (ref 0.5–1.3)
EGFRCR SERPLBLD CKD-EPI 2021: >90 ML/MIN/1.73M*2
EOSINOPHIL # BLD AUTO: 0.25 X10*3/UL (ref 0–0.7)
EOSINOPHIL NFR BLD AUTO: 4.1 %
ERYTHROCYTE [DISTWIDTH] IN BLOOD BY AUTOMATED COUNT: 12.6 % (ref 11.5–14.5)
GLUCOSE SERPL-MCNC: 123 MG/DL (ref 74–99)
HCT VFR BLD AUTO: 36.8 % (ref 41–52)
HGB BLD-MCNC: 12.8 G/DL (ref 13.5–17.5)
IMM GRANULOCYTES # BLD AUTO: 0.01 X10*3/UL (ref 0–0.7)
IMM GRANULOCYTES NFR BLD AUTO: 0.2 % (ref 0–0.9)
LYMPHOCYTES # BLD AUTO: 1.93 X10*3/UL (ref 1.2–4.8)
LYMPHOCYTES NFR BLD AUTO: 31.6 %
MCH RBC QN AUTO: 30.6 PG (ref 26–34)
MCHC RBC AUTO-ENTMCNC: 34.8 G/DL (ref 32–36)
MCV RBC AUTO: 88 FL (ref 80–100)
MONOCYTES # BLD AUTO: 0.48 X10*3/UL (ref 0.1–1)
MONOCYTES NFR BLD AUTO: 7.9 %
NEUTROPHILS # BLD AUTO: 3.38 X10*3/UL (ref 1.2–7.7)
NEUTROPHILS NFR BLD AUTO: 55.4 %
NRBC BLD-RTO: 0 /100 WBCS (ref 0–0)
PLATELET # BLD AUTO: 216 X10*3/UL (ref 150–450)
POTASSIUM SERPL-SCNC: 3.7 MMOL/L (ref 3.5–5.3)
PROT SERPL-MCNC: 6.3 G/DL (ref 6.4–8.2)
RBC # BLD AUTO: 4.18 X10*6/UL (ref 4.5–5.9)
SODIUM SERPL-SCNC: 139 MMOL/L (ref 136–145)
WBC # BLD AUTO: 6.1 X10*3/UL (ref 4.4–11.3)

## 2024-10-01 PROCEDURE — 2500000004 HC RX 250 GENERAL PHARMACY W/ HCPCS (ALT 636 FOR OP/ED)

## 2024-10-01 PROCEDURE — 71045 X-RAY EXAM CHEST 1 VIEW: CPT | Performed by: RADIOLOGY

## 2024-10-01 PROCEDURE — 80053 COMPREHEN METABOLIC PANEL: CPT

## 2024-10-01 PROCEDURE — 70450 CT HEAD/BRAIN W/O DYE: CPT

## 2024-10-01 PROCEDURE — 93005 ELECTROCARDIOGRAM TRACING: CPT

## 2024-10-01 PROCEDURE — 96374 THER/PROPH/DIAG INJ IV PUSH: CPT

## 2024-10-01 PROCEDURE — 70450 CT HEAD/BRAIN W/O DYE: CPT | Performed by: RADIOLOGY

## 2024-10-01 PROCEDURE — 71045 X-RAY EXAM CHEST 1 VIEW: CPT

## 2024-10-01 PROCEDURE — 36415 COLL VENOUS BLD VENIPUNCTURE: CPT

## 2024-10-01 PROCEDURE — 84484 ASSAY OF TROPONIN QUANT: CPT

## 2024-10-01 PROCEDURE — 82140 ASSAY OF AMMONIA: CPT

## 2024-10-01 PROCEDURE — 85025 COMPLETE CBC W/AUTO DIFF WBC: CPT

## 2024-10-01 PROCEDURE — 99285 EMERGENCY DEPT VISIT HI MDM: CPT

## 2024-10-01 RX ORDER — LORAZEPAM 2 MG/ML
0.5 INJECTION INTRAMUSCULAR ONCE
Status: COMPLETED | OUTPATIENT
Start: 2024-10-01 | End: 2024-10-01

## 2024-10-01 RX ORDER — LORAZEPAM 2 MG/ML
1 INJECTION INTRAMUSCULAR ONCE
Status: COMPLETED | OUTPATIENT
Start: 2024-10-01 | End: 2024-10-02

## 2024-10-01 ASSESSMENT — COLUMBIA-SUICIDE SEVERITY RATING SCALE - C-SSRS
1. IN THE PAST MONTH, HAVE YOU WISHED YOU WERE DEAD OR WISHED YOU COULD GO TO SLEEP AND NOT WAKE UP?: NO
6. HAVE YOU EVER DONE ANYTHING, STARTED TO DO ANYTHING, OR PREPARED TO DO ANYTHING TO END YOUR LIFE?: NO
2. HAVE YOU ACTUALLY HAD ANY THOUGHTS OF KILLING YOURSELF?: NO

## 2024-10-01 NOTE — ED TRIAGE NOTES
Pt arrives via medic from home with c/o verbally aggressive, worsening hallucinations for several months. Spouse called; states she is afraid pt may turn violent. Recent Seroquel increased dose. Pt lives with his wife. Pt denies SI/HI. PMHx Alzheimer's, HTN

## 2024-10-01 NOTE — TELEPHONE ENCOUNTER
Result Communication    Resulted Orders   CBC   Result Value Ref Range    WBC 6.2 4.4 - 11.3 x10*3/uL    nRBC 0.0 0.0 - 0.0 /100 WBCs    RBC 4.34 (L) 4.50 - 5.90 x10*6/uL    Hemoglobin 13.1 (L) 13.5 - 17.5 g/dL    Hematocrit 38.2 (L) 41.0 - 52.0 %    MCV 88 80 - 100 fL    MCH 30.2 26.0 - 34.0 pg    MCHC 34.3 32.0 - 36.0 g/dL    RDW 12.4 11.5 - 14.5 %    Platelets 287 150 - 450 x10*3/uL   Comprehensive metabolic panel   Result Value Ref Range    Glucose 87 74 - 99 mg/dL    Sodium 139 136 - 145 mmol/L    Potassium 4.1 3.5 - 5.3 mmol/L    Chloride 104 98 - 107 mmol/L    Bicarbonate 26 21 - 32 mmol/L    Anion Gap 13 10 - 20 mmol/L    Urea Nitrogen 14 6 - 23 mg/dL    Creatinine 0.60 0.50 - 1.30 mg/dL    eGFR >90 >60 mL/min/1.73m*2      Comment:      Calculations of estimated GFR are performed using the 2021 CKD-EPI Study Refit equation without the race variable for the IDMS-Traceable creatinine methods.  https://jasn.asnjournals.org/content/early/2021/09/22/ASN.4922232598    Calcium 9.2 8.6 - 10.3 mg/dL    Albumin 4.4 3.4 - 5.0 g/dL    Alkaline Phosphatase 42 33 - 136 U/L    Total Protein 6.7 6.4 - 8.2 g/dL    AST 14 9 - 39 U/L    Bilirubin, Total 1.0 0.0 - 1.2 mg/dL    ALT 13 10 - 52 U/L      Comment:      Patients treated with Sulfasalazine may generate falsely decreased results for ALT.   Tsh With Reflex To Free T4 If Abnormal   Result Value Ref Range    Thyroid Stimulating Hormone 2.12 0.44 - 3.98 mIU/L    Narrative    TSH testing is performed using different testing methodology at [facility] than at other [facility] hospitals. Direct result comparisons should only be made within the same method.     Vitamin D 25-Hydroxy,Total (for eval of Vitamin D levels)   Result Value Ref Range    Vitamin D, 25-Hydroxy, Total 42 30 - 100 ng/mL    Narrative    Deficiency:         < 20   ng/ml  Insufficiency:      20-29  ng/ml  Sufficiency:         ng/ml  This assay accurately quantifies the sum of Vitamin D3,  25-Hydroxy and Vitamin D2,25-Hydroxy.       2:43 PM      Results were successfully communicated with the  patients wife Xiomara  and they acknowledged their understanding.  No changes in plan needed.   Ave Koenig, APRN-CNP

## 2024-10-02 VITALS
DIASTOLIC BLOOD PRESSURE: 72 MMHG | RESPIRATION RATE: 18 BRPM | HEIGHT: 72 IN | TEMPERATURE: 98.2 F | WEIGHT: 243.4 LBS | BODY MASS INDEX: 32.97 KG/M2 | SYSTOLIC BLOOD PRESSURE: 136 MMHG | OXYGEN SATURATION: 97 % | HEART RATE: 74 BPM

## 2024-10-02 LAB
AMPHETAMINES UR QL SCN: NORMAL
APPEARANCE UR: CLEAR
BARBITURATES UR QL SCN: NORMAL
BENZODIAZ UR QL SCN: NORMAL
BILIRUB UR STRIP.AUTO-MCNC: NEGATIVE MG/DL
BZE UR QL SCN: NORMAL
CANNABINOIDS UR QL SCN: NORMAL
COLOR UR: YELLOW
ETHANOL SERPL-MCNC: <10 MG/DL
FENTANYL+NORFENTANYL UR QL SCN: NORMAL
GLUCOSE UR STRIP.AUTO-MCNC: NORMAL MG/DL
HOLD SPECIMEN: NORMAL
KETONES UR STRIP.AUTO-MCNC: NEGATIVE MG/DL
LEUKOCYTE ESTERASE UR QL STRIP.AUTO: NEGATIVE
METHADONE UR QL SCN: NORMAL
NITRITE UR QL STRIP.AUTO: NEGATIVE
OPIATES UR QL SCN: NORMAL
OXYCODONE+OXYMORPHONE UR QL SCN: NORMAL
PCP UR QL SCN: NORMAL
PH UR STRIP.AUTO: 6 [PH]
PROT UR STRIP.AUTO-MCNC: NEGATIVE MG/DL
RBC # UR STRIP.AUTO: NEGATIVE /UL
SP GR UR STRIP.AUTO: 1.03
UROBILINOGEN UR STRIP.AUTO-MCNC: NORMAL MG/DL

## 2024-10-02 PROCEDURE — 82077 ASSAY SPEC XCP UR&BREATH IA: CPT | Performed by: STUDENT IN AN ORGANIZED HEALTH CARE EDUCATION/TRAINING PROGRAM

## 2024-10-02 PROCEDURE — 80307 DRUG TEST PRSMV CHEM ANLYZR: CPT

## 2024-10-02 PROCEDURE — 36415 COLL VENOUS BLD VENIPUNCTURE: CPT | Performed by: STUDENT IN AN ORGANIZED HEALTH CARE EDUCATION/TRAINING PROGRAM

## 2024-10-02 PROCEDURE — 2500000004 HC RX 250 GENERAL PHARMACY W/ HCPCS (ALT 636 FOR OP/ED)

## 2024-10-02 PROCEDURE — 90839 PSYTX CRISIS INITIAL 60 MIN: CPT

## 2024-10-02 PROCEDURE — 81003 URINALYSIS AUTO W/O SCOPE: CPT

## 2024-10-02 PROCEDURE — 96376 TX/PRO/DX INJ SAME DRUG ADON: CPT

## 2024-10-02 SDOH — HEALTH STABILITY: MENTAL HEALTH: SUICIDAL BEHAVIOR (LIFETIME): NO

## 2024-10-02 SDOH — HEALTH STABILITY: MENTAL HEALTH: IN THE PAST FEW WEEKS, HAVE YOU FELT THAT YOU OR YOUR FAMILY WOULD BE BETTER OFF IF YOU WERE DEAD?: NO

## 2024-10-02 SDOH — HEALTH STABILITY: MENTAL HEALTH: HAVE YOU EVER TRIED TO KILL YOURSELF?: NO

## 2024-10-02 SDOH — HEALTH STABILITY: MENTAL HEALTH: DEPRESSION SYMPTOMS: ISOLATIVE;INCREASED IRRITABILITY;SLEEP DISTURBANCE;PSYCHOMOTOR RETARDATION

## 2024-10-02 SDOH — HEALTH STABILITY: MENTAL HEALTH: WISH TO BE DEAD (PAST 1 MONTH): NO

## 2024-10-02 SDOH — HEALTH STABILITY: MENTAL HEALTH: IN THE PAST WEEK, HAVE YOU BEEN HAVING THOUGHTS ABOUT KILLING YOURSELF?: NO

## 2024-10-02 SDOH — HEALTH STABILITY: MENTAL HEALTH: IN THE PAST FEW WEEKS, HAVE YOU WISHED YOU WERE DEAD?: NO

## 2024-10-02 SDOH — ECONOMIC STABILITY: HOUSING INSECURITY: FEELS SAFE LIVING IN HOME: NO

## 2024-10-02 SDOH — HEALTH STABILITY: MENTAL HEALTH: ANXIETY SYMPTOMS: GENERALIZED;UNEXPLAINED FEARS

## 2024-10-02 SDOH — HEALTH STABILITY: MENTAL HEALTH: ARE YOU HAVING THOUGHTS OF KILLING YOURSELF RIGHT NOW?: NO

## 2024-10-02 SDOH — HEALTH STABILITY: MENTAL HEALTH: NON-SPECIFIC ACTIVE SUICIDAL THOUGHTS (PAST 1 MONTH): NO

## 2024-10-02 ASSESSMENT — LIFESTYLE VARIABLES
SUBSTANCE_ABUSE_PAST_12_MONTHS: NO
PRESCIPTION_ABUSE_PAST_12_MONTHS: NO

## 2024-10-02 ASSESSMENT — PAIN SCALES - GENERAL: PAINLEVEL_OUTOF10: 0 - NO PAIN

## 2024-10-02 NOTE — ED PROVIDER NOTES
Endorsed to me pending inpatient psychiatric placement.  [facility] remained [facility].    ED Course as of 10/02/24 1414   Tue Oct 01, 2024   2201 I personally reviewed and interpreted the EKG @ 2200: NSR 67, normal axis, no appreciable ischemia, 1st deg AVB, prolonged Qtc 460 ms, and no prior EKG available for review. [BC]      ED Course User Index  [BC] Tam Garg MD         Diagnoses as of 10/02/24 1414   Hallucinations   Severe early onset Alzheimer's dementia, unspecified whether behavioral, psychotic, or mood disturbance or anxiety          Theresa Rojas MD  10/02/24 1414

## 2024-10-02 NOTE — PROGRESS NOTES
EPAT - Social Work Psychiatric Assessment    Arrival Details  Mode of Arrival: Ambulance  Admission Source: Home  Admission Type: Involuntary  EPAT Assessment Start Date: 10/01/24  EPAT Assessment Start Time: 2200  Name of : Nava MCKEON    History of Present Illness  Admission Reason: Psychiatric Evaluation  HPI: Pt is a 65 y/o M presents to [place] ED brought in by his wife for a psychiatric evaluation for hallucinations and agitation.   According to provider, the pt has been seen by his provider who has been increasing seroquil dose and wife reports the pts symptoms are getting worse.  reviewed  the patient's chart and medical record which indicates a history of  Alzheimers dementia with behavioral disturabce and psychosis. No EPAT assessments to review.  The pt is seen by nuerolgy at [facility]. The triage risk assessment was reviewed and the Pt was  indicated to be  no risk during triage assessement due to no reported SI or HI during triage. EPAT consulted for eval.     Readmission Information   Readmission within 30 Days: No    Psychiatric Symptoms  Anxiety Symptoms: Generalized, Unexplained fears  Depression Symptoms: Isolative, Increased irritability, Sleep disturbance, Psychomotor retardation  Margie Symptoms: Poor judgment, Psychomotor agitation    Psychosis Symptoms  Hallucination Type: Auditory, Visual  Delusion Type: Paranoid, Persecutory    Additional Symptoms - Adult  Generalized Anxiety Disorder: Difficulity concentrating, Restlessness, Irritability, Sleep disturbance  Obsessive Compulsive Disorder: Intrusive thoughts, Ruminatory thoughts  Panic Attack: No problems reported or observed.  Post Traumatic Stress Disorder: No problems reported or observed.  Delirium: No problems reported or observed.    Past Psychiatric History/Meds/Treatments  Past Psychiatric History: Diagnosis: Alzheimers dementia with behavioral disturbance diagnosed with alzheimers 2019 , Depression History  of suicide attempts: none reported History of SIB: none reported  Past Psychiatric Meds/Treatments: Medications: seroquil which was recently increased from 125 mg to 150 mg. Pt also takes 500 mg keppra. Pt takes 50 mg zoloft for depression.  Records show pt has been on Namenda in the past but does not currenlty take Compliance: yes wife gives pills and he has had no issues with taking medications. Hospitalizations: None reported  Past Violence/Victimization History: Pt denies current history of abuse and denies any past history of abuse including physical, sexual and emotional abuse. No history of violence noted. Periods of agitation and confusion mainly at night per wife.    Current Mental Health Contacts   Name/Phone Number: Agency: None  Provider Name/Phone Number: Agency: None  Provider Last Appointment Date: [facility] Asya Chung appt 10/11 virtual at 11:30/  homecare PCP [facility]    Support System: Immediate family (wife of 30 years) Pt does state he has 3 daughters.     Living Arrangement: Lives with someone (Lives with wife. They downsized to an apartment over last few years) Pt states he lives at home with his 3 cats.     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 Pt was fired and was diagnosed with Alzeheimers the same week he lost his job.)    [place]ilt[place] Service/Education History  Current or Previous  Service: None  Education Level: High school  History of Learning Problems: No  History of School Behavior Problems: No    Social/Cultural History  Social History: Main Supports Identified: wife         Family History: Pt is estranged from family since the 90's unknown reasons.  Mother is  . Pt and wife have one daughter together and each have a daughter separately. Pt has no relationship with his daughter    Legal  Legal Considerations: Patient/  for Healthcare Needs  Assistance with Managing/Advocating Healthcare Needs: Power of  for Healthcare  Criminal Activity/ Legal Involvement Pertinent to Current Situation/ Hospitalization: None  Legal Concerns: Joaquin: Self POA: wife Xiomara Rees- has paperwork in car and will have scanned in Payee: None  Legal Comments: None reported    Drug Screening  Have you used any substances (canabis, cocaine, heroin, hallucinogens, inhalants, etc.) in the past 12 months?: No  Have you used any prescription drugs other than prescribed in the past 12 months?: No  Is a toxicology screen needed?: Yes    Stage of Change  Duration of Substance Use: Pt has no reported history of alcohol or drugs    Behavioral Health  Behavioral Health(WDL): Exceptions to WDL  Behaviors/Mood: Hallucinations  Affect: Unable to assess  Parent/Guardian/Significant Other Involvement: Attentive to patient needs  Family Behaviors: Appropriate for situation  Visitor Behaviors: Appropriate for situation  Needs Expressed: Denies  Emotional Support Given: Family counseling    Orientation  Orientation Level: Disoriented to place, Disoriented to time, Disoriented to situation    General Appearance  Motor Activity: Restlessness, Agitation  Speech Pattern: Slow, Pressured, Word salad  General Attitude: Cooperative  Appearance/Hygiene: Unremarkable    Thought Process  Content: Unable to assess  Delusions: Paranoid, Persecutory  Perception: Hallucinations  Hallucination: Auditory, Visual  Judgment/Insight: Poor  Confusion: Severe  Cognition: Poor judgement, Long term memory loss, Poor attention/concentration, Short term memory loss    Sleep Pattern  Sleep Pattern: Disturbed/interrupted sleep, Restlessness    Risk Factors  Self Harm/Suicidal Ideation Plan: Current:  denies  Previous Self Harm/Suicidal Plans: Past: denies  Risk Factors: Major mental illness, Male, Unemployment    Violence Risk Assessment  Assessment of Violence: None noted  Thoughts of Harm to Others: No    Ability to Assess Risk Screen  Risk Screen - Ability to Assess: Able to be screened  Ask Suicide-Screening Questions  1. In the past few weeks, have you wished you were dead?: No  2. In the past few weeks, have you felt that you or your family would be better off if you were dead?: No  3. In the past week, have you been having thoughts about killing yourself?: No  4. Have you ever tried to kill yourself?: No  5. Are you having thoughts of killing yourself right now?: No  Calculated Risk Score: No intervention is necessary  [place] Suicide Severity Rating Scale (Screener/Recent Self-Report)  1. Wish to be Dead (Past 1 Month): No  2. Non-Specific Active Suicidal Thoughts (Past 1 Month): No  6. Suicidal Behavior (Lifetime): No  Calculated C-SSRS Risk Score (Lifetime/Recent): No Risk Indicated  Step 1: Risk Factors  Current & Past Psychiatric Dx: Psychotic disorder  Presenting Symptoms: Psychosis  Precipitants/Stressors: Chronic physical pain or other acute medical problem (e.g. CNS disorders)  Change in Treatment: Change in provider or treament (i.e., medications, psychotherapy, milieu)  Access to Lethal Methods : No  Step 2: Protective Factors   Protective Factors Internal: Identifies reasons for living  Protective Factors External: Supportive social network or family or friends, Responsibility to children  Step 3: Suicidal Ideation Intensity  Are There Things - Anyone or Anything - That Stopped You From Wanting to Die or Acting on: Does not apply  What Sort of Reasons Did You Have For Thinking About Wanting to Die or Killing Yourself: Does not apply  Step 5: Documentation  Risk Level: Low suicide risk    Psychiatric Impression and Plan of Care  Assessment and Plan: Upon assessment, Pt presents restless  "sitting on the edge of the hospital bed. Pt disoriented. Pt does not know his age but is able to tell me his birthday. He seems to be trying to find the words for where he is currently but unable to articulate thoughts.  He denies any issues with depression and denies any suicidal thoughts.  The [place] -Suicide Severity Rating Scale (C-SSRS) was reviewed after the assessment was completed and the patient was indicated to be low risk. The pt denies HI, AH and VH. He is a poor historian. He states he can care for his own ADL's at home but wife says she assists him with most task as he forgets where he even is. He is able to say he is here because some belle came into his home. His words essential come out as word salad \"this morning some belle came house jump spot people were in place people do things shouldn't have done proper somebody jump time to ahhhh ahhhhh things we do for them ahhh\".  Most essential information comes from his wife Xiomara who is present. She supplied a video of pt hallucinating and talking to someone who was not there. She states he has had some visual disturances and hallucinations in the past 3 years after being diagnosed with Alzeheimers dementia nearly 6 years ago. She states she has always been able to calm him down and care for him but reports significant struggles currently. He is seeing neurolgy who has been increasing seroquil to try to get the hallucinations under control but she reports they are just getting worse with the increase dose. To the point she no longer feels comfortable doing this med adjustment on her own at home. She states he wakes up in the middle of the night hallucinating and it is becoming very rough at home as he is seeing people in the house. They have to keep all the blinds closed due to him feeling paranoid. He gets agitated and sometimes is not recognizing wife now when she tries to calm him. She does feel scared at times even though he has never been violent " with her. She states today he was terrified of people in the home who were holding stakes and pounding them on the ground. She states that she has started the process through [facility] on aging  and they had told her he requires a nursing level of care so she is in the process of those steps to find appropriate long term care facility. She is hopeful he can have medication adjusted to best combination possible and then move forward with these next steps. She is okay if he has to come home prior to that being arranged. For today she does not feel safe with him at home. Pt would benefit from inpatient admission for further evaluation, safety and stabilization.     Specific Resources Provided to Patient: Peer support services not needed and /or not available at this time.  Plan Comments: Diagnostic Impression: Alzeimers dementia with behavioral disturbance and psychosis   Plan: Inpatient admission    Outcome/Disposition  Assessment, Recommendations and Risk Level Reviewed with: Patient indicated to be low risk level after assessment completed. Reviewed recommendation with ED Physician, Dr Quintero who is inagreement with the recommendation for inpatient admission  Contact Name: Xiomara  Contact Number(s): [phone]  Contact Relationship: wife  EPAT Assessment Completed Date: 10/02/24  EPAT Assessment Completed Time: 2250  Patient Disposition: Out of network facility (Specify)

## 2024-10-02 NOTE — ED PROVIDER NOTES
Supervisory note:  Patient seen in conjunction with ROMEO Edmonds.  Patient presents with history of dementia and worsening behaviors.  He has been having hallucinations and verbally aggressive with his wife.  She has been working with her doctor to potentially establish home health care but this has not yet occurred.  Patient's vital signs are unremarkable except for systolic hypertension.  Laboratory studies without significant abnormalities.    Patient pending [facility] psych placement at this time.    I personally saw the patient and made/approved the management plan and take responsiblity for the patient management.  Parts of this chart were completed with dictation software, please excuse any errors in transcription.     Mayur Quintero MD  10/02/24 6719

## 2024-10-02 NOTE — PROGRESS NOTES
Patient accepted to [facility] by Dr. De La Torre. Nursing report number [phone].

## 2024-10-02 NOTE — ED PROVIDER NOTES
HPI   Chief Complaint   Patient presents with    Verbally Aggressive       Patient is a 64-year-old male with past medical history of Alzheimer's dementia on Seroquel presenting via EMS from home with verbally aggressive behavior.  Report is that the patient has been having worsening hallucinations over the last several months as the Seroquel has been increased.  The wife at bedside states that initially the hallucinations did not involve her but now they do.  She states that she is afraid that he may turn violent.  She does endorse that she is talking to palliative care who recommended that she bring the patient to the ER for evaluation for possible geropsych.  Patient is unable to provide a reliable history.              Patient History   Past Medical History:   Diagnosis Date    Dementia      History reviewed. No pertinent surgical history.  No family history on file.  Social History     Tobacco Use    Smoking status: Never     Passive exposure: Never    Smokeless tobacco: Never   Substance Use Topics    Alcohol use: Not Currently    Drug use: Never       Physical Exam   ED Triage Vitals [10/01/24 1943]   Temperature Heart Rate Respirations BP   36.7 °C (98.1 °F) 64 18 164/80      Pulse Ox Temp Source Heart Rate Source Patient Position   96 % Oral -- --      BP Location FiO2 (%)     -- --       Physical Exam  Vitals and nursing note reviewed.   Constitutional:       Appearance: He is well-developed.      Comments: Awake, laying in examination bed   HENT:      Head: Normocephalic and atraumatic.      Nose: Nose normal.      Mouth/Throat:      Mouth: Mucous membranes are moist.      Pharynx: Oropharynx is clear.   Eyes:      Extraocular Movements: Extraocular movements intact.      Conjunctiva/sclera: Conjunctivae normal.      Pupils: Pupils are equal, round, and reactive to light.   Cardiovascular:      Rate and Rhythm: Normal rate and regular rhythm.      Pulses: Normal pulses.      Heart sounds: Normal heart  sounds. No murmur heard.  Pulmonary:      Effort: Pulmonary effort is normal. No respiratory distress.      Breath sounds: Normal breath sounds.   Abdominal:      General: Abdomen is flat.      Palpations: Abdomen is soft.      Tenderness: There is no abdominal tenderness.   Musculoskeletal:         General: No swelling. Normal range of motion.      Cervical back: Normal range of motion and neck supple.   Skin:     General: Skin is warm and dry.      Capillary Refill: Capillary refill takes less than 2 seconds.   Neurological:      General: No focal deficit present.      Mental Status: He is alert. Mental status is at baseline.   Psychiatric:         Mood and Affect: Mood normal.         Behavior: Behavior normal.           ED Course & MDM   ED Course as of 10/02/24 0105   Tue Oct 01, 2024   2201 I personally reviewed and interpreted the EKG @ 2200: NSR 67, normal axis, no appreciable ischemia, 1st deg AVB, prolonged Qtc 460 ms, and no prior EKG available for review. [BC]      ED Course User Index  [BC] Tam Garg MD         Diagnoses as of 10/02/24 0105   Hallucinations   Severe early onset Alzheimer's dementia, unspecified whether behavioral, psychotic, or mood disturbance or anxiety                 No data recorded     Chuckey Coma Scale Score: 14 (10/01/24 1948 : Mini Giron RN)                           Medical Decision Making  Patient is a 64-year-old male with past medical history of Alzheimer's dementia on Seroquel presenting via EMS from home with verbally aggressive behavior.  Lab work, urine, imaging ordered.  Conditions considered include but are not limited to: Viral illness, UTI, pneumonia, intracranial pathology, medication side effect.    I saw this patient in conjunction with Dr. Quintero.  CBC is without leukocytosis or signs of anemia with hemoglobin of 12.8.  Ammonia is elevated at 56 but does show that there is elevations likely related to lipemia.  Initial and repeat troponin within  normal limits.  CMP without significant electrolyte abnormality or renal impairment.  Patient refusing COVID swab.  Wife is asking for something for agitation.  0.5 mg Ativan ordered.  CT head without acute finding.  CT C-spine without acute findings.  Urine and urine drug screen are pending at this time.    Patient has been evaluated by the psychiatric team and is deemed appropriate for geropsych placement.    Time of my departure.  Please refer to attending physician note for further evaluation, treatment and final disposition.    Portions of this note made with Dragon software, please be mindful of potential grammatical errors.        Medications   LORazepam (Ativan) injection 0.5 mg (0.5 mg intravenous Given 10/1/24 8337)   LORazepam (Ativan) injection 1 mg (1 mg intravenous Given 10/2/24 0035)         Labs Reviewed   CBC WITH AUTO DIFFERENTIAL - Abnormal       Result Value    WBC 6.1      nRBC 0.0      RBC 4.18 (*)     Hemoglobin 12.8 (*)     Hematocrit 36.8 (*)     MCV 88      MCH 30.6      MCHC 34.8      RDW 12.6      Platelets 216      Neutrophils % 55.4      Immature Granulocytes %, Automated 0.2      Lymphocytes % 31.6      Monocytes % 7.9      Eosinophils % 4.1      Basophils % 0.8      Neutrophils Absolute 3.38      Immature Granulocytes Absolute, Automated 0.01      Lymphocytes Absolute 1.93      Monocytes Absolute 0.48      Eosinophils Absolute 0.25      Basophils Absolute 0.05     COMPREHENSIVE METABOLIC PANEL - Abnormal    Glucose 123 (*)     Sodium 139      Potassium 3.7      Chloride 106      Bicarbonate 25      Anion Gap 12      Urea Nitrogen 16      Creatinine 0.68      eGFR >90      Calcium 9.0      Albumin 4.0      Alkaline Phosphatase 45      Total Protein 6.3 (*)     AST 13      Bilirubin, Total 0.7      ALT 9 (*)    AMMONIA - Abnormal    Ammonia 56 (*)    SERIAL TROPONIN-INITIAL - Normal    Troponin I, High Sensitivity 3      Narrative:     Less than 99th percentile of normal range  cutoff-  Female and children under 18 years old <14 ng/L; Male <21 ng/L: Negative  Repeat testing should be performed if clinically indicated.     Female and children under 18 years old 14-50 ng/L; Male 21-50 ng/L:  Consistent with possible cardiac damage and possible increased clinical   risk. Serial measurements may help to assess extent of myocardial damage.     >50 ng/L: Consistent with cardiac damage, increased clinical risk and  myocardial infarction. Serial measurements may help assess extent of   myocardial damage.      NOTE: Children less than 1 year old may have higher baseline troponin   levels and results should be interpreted in conjunction with the overall   clinical context.     NOTE: Troponin I testing is performed using a different   testing methodology at [facility] than at other   [facility]. Direct result comparisons should only   be made within the same method.   SERIAL TROPONIN, 1 HOUR - Normal    Troponin I, High Sensitivity <3      Narrative:     Less than 99th percentile of normal range cutoff-  Female and children under 18 years old <14 ng/L; Male <21 ng/L: Negative  Repeat testing should be performed if clinically indicated.     Female and children under 18 years old 14-50 ng/L; Male 21-50 ng/L:  Consistent with possible cardiac damage and possible increased clinical   risk. Serial measurements may help to assess extent of myocardial damage.     >50 ng/L: Consistent with cardiac damage, increased clinical risk and  myocardial infarction. Serial measurements may help assess extent of   myocardial damage.      NOTE: Children less than 1 year old may have higher baseline troponin   levels and results should be interpreted in conjunction with the overall   clinical context.     NOTE: Troponin I testing is performed using a different   testing methodology at [facility] than at other   [facility]. Direct result comparisons should only   be made within the  same method.   TROPONIN SERIES- (INITIAL, 1 HR)    Narrative:     The following orders were created for panel order Troponin I Series, High Sensitivity (0, 1 HR).  Procedure                               Abnormality         Status                     ---------                               -----------         ------                     Troponin I, High Sensiti...[[id number]]  Normal              Final result               Troponin, High Sensitivi...[[id number]]  Normal              Final result                 Please view results for these tests on the individual orders.   URINALYSIS WITH REFLEX CULTURE AND MICROSCOPIC    Narrative:     The following orders were created for panel order Urinalysis with Reflex Culture and Microscopic.  Procedure                               Abnormality         Status                     ---------                               -----------         ------                     Urinalysis with Reflex C...[[id number]]                                                 Extra Urine Gray Tube[[id number]]                                                         Please view results for these tests on the individual orders.   DRUG SCREEN,URINE   SARS-COV-2 PCR   URINALYSIS WITH REFLEX CULTURE AND MICROSCOPIC   EXTRA URINE GRAY TUBE         CT head wo IV contrast   Final Result   No evidence of acute cortical infarct or intracranial hemorrhage.        MACRO:   None        Signed by: Gagandeep Ulloa 10/1/2024 9:27 PM   Dictation workstation:   [id number]      XR chest 1 view   Final Result   1.  No evidence of acute cardiopulmonary process.                  MACRO:   None        Signed by: Gagandeep Ulloa 10/1/2024 8:14 PM   Dictation workstation:   [id number]            Procedure  Procedures     Sarkis Freitas PA-C  10/02/24 0105

## 2024-10-03 ENCOUNTER — TELEPHONE (OUTPATIENT)
Dept: PRIMARY CARE | Facility: CLINIC | Age: 64
End: 2024-10-03
Payer: MEDICARE

## 2024-10-03 LAB
ATRIAL RATE: 67 BPM
P AXIS: 30 DEGREES
P OFFSET: 147 MS
P ONSET: 87 MS
PR INTERVAL: 222 MS
Q ONSET: 198 MS
QRS COUNT: 11 BEATS
QRS DURATION: 140 MS
QT INTERVAL: 436 MS
QTC CALCULATION(BAZETT): 460 MS
QTC FREDERICIA: 452 MS
R AXIS: -32 DEGREES
T AXIS: 6 DEGREES
T OFFSET: 416 MS
VENTRICULAR RATE: 67 BPM

## 2024-10-03 NOTE — TELEPHONE ENCOUNTER
Patient is active with [facility], followed by Ave Koenig NP. Patient was treated and released from [facility] ED 10/1/24 and transferred to [facility]. Per ED note: PMH of Alzheimer's dementia on Seroquel presenting via EMS from home with verbally aggressive behavior. CBC without leukocytosis or signs of anemia with Hgb of 12.8.  Ammonia elevated at 56 but does show that there is elevations likely related to lipemia. Initial and repeat troponin WNL. CMP without significant electrolyte abnormality or renal impairment.  Patient refusing COVID swab.  Wife is asking for something for agitation.  0.5 mg Ativan ordered. CT head without acute finding. CT C-spine without acute findings.  Urine and urine drug screen are pending at this time. Patient has been evaluated by the psychiatric team and is deemed appropriate for geropsych placement. [facility] will follow.

## 2024-10-10 NOTE — TELEPHONE ENCOUNTER
CHRISTOPHER Dorado - Phoned Xiomara/ wife in follow up  and she reported her  is still at [facility] and the current plan is for him to discharge to a nursing home for extended care. She is in agreement to contact the [facility] office if there is a change of plan. She expressed appreciation for the call.

## 2024-10-17 ENCOUNTER — TELEPHONE (OUTPATIENT)
Dept: PRIMARY CARE | Facility: CLINIC | Age: 64
End: 2024-10-17
Payer: MEDICARE

## 2024-10-17 NOTE — TELEPHONE ENCOUNTER
Pt is now at [facility]. I advised wife to let office know if he comes home.

## 2024-11-18 ENCOUNTER — TELEPHONE (OUTPATIENT)
Dept: PRIMARY CARE | Facility: CLINIC | Age: 64
End: 2024-11-18
Payer: MEDICARE

## 2024-11-20 ENCOUNTER — APPOINTMENT (OUTPATIENT)
Dept: PRIMARY CARE | Facility: CLINIC | Age: 64
End: 2024-11-20
Payer: MEDICARE